# Patient Record
Sex: FEMALE | Race: BLACK OR AFRICAN AMERICAN | NOT HISPANIC OR LATINO | Employment: UNEMPLOYED | URBAN - METROPOLITAN AREA
[De-identification: names, ages, dates, MRNs, and addresses within clinical notes are randomized per-mention and may not be internally consistent; named-entity substitution may affect disease eponyms.]

---

## 2023-01-17 PROBLEM — R10.84 GENERALIZED ABDOMINAL PAIN: Status: ACTIVE | Noted: 2023-01-17

## 2023-01-17 PROBLEM — R00.0 SINUS TACHYCARDIA: Status: ACTIVE | Noted: 2023-01-17

## 2023-01-17 PROBLEM — A41.9 SEPSIS: Status: ACTIVE | Noted: 2023-01-17

## 2023-01-18 PROBLEM — R78.81 BACTEREMIA: Status: ACTIVE | Noted: 2023-01-18

## 2023-01-18 PROBLEM — E87.6 HYPOKALEMIA: Status: ACTIVE | Noted: 2023-01-18

## 2023-01-18 PROBLEM — A41.9 SEPSIS: Status: RESOLVED | Noted: 2023-01-17 | Resolved: 2023-01-18

## 2023-01-19 PROBLEM — A41.89 GRAM POSITIVE SEPTICEMIA: Status: ACTIVE | Noted: 2023-01-19

## 2023-01-19 PROBLEM — E88.09 HYPOALBUMINEMIA: Status: ACTIVE | Noted: 2023-01-19

## 2023-01-19 PROBLEM — E87.6 HYPOKALEMIA: Status: RESOLVED | Noted: 2023-01-18 | Resolved: 2023-01-19

## 2023-01-19 PROBLEM — R00.0 SINUS TACHYCARDIA: Status: RESOLVED | Noted: 2023-01-17 | Resolved: 2023-01-19

## 2023-01-21 PROBLEM — B37.31 VAGINAL CANDIDIASIS: Status: ACTIVE | Noted: 2023-01-21

## 2023-02-10 PROBLEM — D06.9 CIN III (CERVICAL INTRAEPITHELIAL NEOPLASIA GRADE III) WITH SEVERE DYSPLASIA: Status: ACTIVE | Noted: 2023-02-10

## 2023-02-27 PROBLEM — N94.6 DYSMENORRHEA: Status: ACTIVE | Noted: 2023-02-27

## 2023-03-23 PROBLEM — Z98.890 S/P BLADDER REPAIR: Status: ACTIVE | Noted: 2023-03-23

## 2023-03-23 PROBLEM — Z90.710 S/P LAPAROSCOPIC ASSISTED VAGINAL HYSTERECTOMY (LAVH): Status: ACTIVE | Noted: 2023-03-23

## 2023-04-02 ENCOUNTER — NURSE TRIAGE (OUTPATIENT)
Dept: ADMINISTRATIVE | Facility: CLINIC | Age: 33
End: 2023-04-02

## 2023-04-03 NOTE — TELEPHONE ENCOUNTER
Reason for Disposition   Leakage of urine around catheter    Additional Information   Negative: Shock suspected (e.g., cold/pale/clammy skin, too weak to stand, low BP, rapid pulse)   Negative: Sounds like a life-threatening emergency to the triager   Negative: [1] Catheter was accidentally pulled-out AND [2] bright red continuous bleeding   Negative: SEVERE abdominal pain   Negative: Fever > 100.4 F (38.0 C)   Negative: [1] Drinking very little AND [2] dehydration suspected (e.g., no urine > 12 hours, very dry mouth, very lightheaded)   Negative: Patient sounds very sick or weak to the triager   Negative: Catheter was accidentally pulled-out   Negative: [1] Catheter is broken AND [2] is not usable   Negative: Bleeding around catheter (e.g., from penis or female urethra)   Negative: Lower abdominal pain or distention   Negative: [1] No urine in bag > 4 hours AND [2] catheter is not kinked   Negative: [1] Tea-colored or slightly red urine lasts > 24 hours AND [2] not cleared by increasing fluid intake    AND [3] no recent prostate or bladder surgery   Negative: [1] Cloudy urine lasts > 24 hours AND [2] not cleared by increasing fluid intake   Negative: [1] Bloody or red-colored urine AND [2] no recent prostate or bladder surgery  (Exception: brief episode and urine now clear)   Negative: [1] Bloody or red-colored urine AND [2] prostate or bladder surgery > 3 days (72 hours) ago   Negative: Urine smells bad   Negative: [1] Catheter is broken or cracked AND [2] is still usable    Protocols used: Urinary Catheter (e.g., [facility]) Symptoms and [id number]  Hyst 3/23  pt at home with cath in - supposed to come out in a few days. pt states went to use restroom to pass BM- pt states she felt urine go into toilet. Urine not in bag but pt just emptied cath bag 10 mins ago. Pt states she is having spotting and some abd distention but unchanged from surg. Afeb. No blood in urine. Rec to follow up with  in am. May be related  to bladder spasms. Offered protocol advice.

## 2023-04-24 PROBLEM — A41.89 GRAM POSITIVE SEPTICEMIA: Status: RESOLVED | Noted: 2023-01-19 | Resolved: 2023-04-24
